# Patient Record
Sex: FEMALE | Race: BLACK OR AFRICAN AMERICAN | NOT HISPANIC OR LATINO | URBAN - METROPOLITAN AREA
[De-identification: names, ages, dates, MRNs, and addresses within clinical notes are randomized per-mention and may not be internally consistent; named-entity substitution may affect disease eponyms.]

---

## 2019-08-19 ENCOUNTER — CONVERSION ENCOUNTER (OUTPATIENT)
Dept: ORTHOPEDIC SURGERY | Facility: CLINIC | Age: 54
End: 2019-08-19

## 2019-08-19 ENCOUNTER — OFFICE VISIT CONVERTED (OUTPATIENT)
Dept: ORTHOPEDIC SURGERY | Facility: CLINIC | Age: 54
End: 2019-08-19
Attending: ORTHOPAEDIC SURGERY

## 2019-08-26 ENCOUNTER — OFFICE VISIT CONVERTED (OUTPATIENT)
Dept: ORTHOPEDIC SURGERY | Facility: CLINIC | Age: 54
End: 2019-08-26
Attending: ORTHOPAEDIC SURGERY

## 2020-06-03 ENCOUNTER — CONVERSION ENCOUNTER (OUTPATIENT)
Dept: ORTHOPEDIC SURGERY | Facility: CLINIC | Age: 55
End: 2020-06-03

## 2020-06-03 ENCOUNTER — OFFICE VISIT CONVERTED (OUTPATIENT)
Dept: ORTHOPEDIC SURGERY | Facility: CLINIC | Age: 55
End: 2020-06-03
Attending: ORTHOPAEDIC SURGERY

## 2021-05-10 NOTE — H&P
History and Physical      Patient Name: Brianna Das   Patient ID: [id number]   Sex: Female   YOB: 1965    Referring Provider: milton CARTAGENA    Visit Date: Radha 3, 2020    Provider: Mayo Hernadez MD   Location: [facility]   Location Address: [address] [place] [address]  [id number]   Location Phone: [phone]          Chief Complaint  · Right Shoulder Pain      History Of Present Illness  Brianna Das is a 55 year old /Black female who presents today to [facility].      She's here for evaluation of right shoulder pain that started in August 2019 from a fall. Patient states pain radiates down right upper arm. Denies receiving any treatment for right shoulder pain. She's using a motorized wheelchair today.       Past Medical History  Anemia; Anemia, Unspecified; Arthritis; Asthma; Bladder Disorder; Bowel Disease; CAD (coronary artery disease); Cervicalgia; Chest pain; Chronic Obstructive Pulmonary Disease; Congestive heart failure; COPD; Diabetes; Diabetes mellitus; GERD; Heart Disease; Hyperlipemia; Hyperlipidemia; Hypertension; Hypothyroidism; Kidney Disease; Limb Swelling; Lumbago; Lumbago/low back pain; Lung disease; Neurologic disorder; Osteoarthritis; Radiculopathy, lumbosacral; Reflux; Seasonal allergies; Shortness of Breath; Stomach Disorder; Thyroid disorder         Past Surgical History  Appendectomy; Cardiac Catherization; Cesarian Section; Cholecystectomy; Colonoscopy; Gallbladder; Hernia; Surgical Clips; Tonsillectomy; Tubal ligation         Medication List  Advair Diskus 250-50 mcg/dose inhalation blister with device; aspirin 81 mg oral tablet,chewable; atorvastatin 20 mg oral tablet; Calcium 600 + D(3) 600 mg calcium- 200 unit oral capsule; cetirizine 10 mg oral tablet; diazepam 2 mg oral tablet; fluticasone 50 mcg/actuation nasal spray,suspension; furosemide 40 mg oral tablet; gabapentin 300 mg oral capsule; hydrochlorothiazide 25 mg  "oral tablet; hydrocodone-acetaminophen 7.5-325 mg oral tablet; hydroxyzine pamoate 50 mg oral capsule; ibuprofen 800 mg oral tablet; Januvia 50 mg oral tablet; levothyroxine 112 mcg oral tablet; lisinopril 20 mg oral tablet; magnesium oxide 400 mg oral capsule; medroxyprogesterone 10 mg oral tablet; metformin 500 mg oral tablet; montelukast 10 mg oral tablet; Nitrostat 0.4 mg sublingual tablet, sublingual; omeprazole 40 mg oral capsule,delayed release(DR/EC); potassium chloride 20 mEq oral tablet extended release; promethazine 25 mg oral tablet; tizanidine 2 mg oral capsule; venlafaxine 150 mg oral capsule,extended release 24hr; Ventolin HFA 90 mcg/actuation inhalation HFA aerosol inhaler; Vitamin D3 1,000 unit oral capsule         Allergy List  No known history of drug allergy; PENICILLINS         Family Medical History  Heart Disease; Diabetes, unspecified type; Diabetes; Osteoporosis; Family history of Arthritis         Social History  Alcohol Use (Never); Claustophobic (Unknown); lives alone; lives with children; Recreational Drug Use (Never); Single.; Tobacco (Former); Unemployed.         Review of Systems  · Constitutional  o Admits  o : weight loss  o Denies  o : fever, chills  · Cardiovascular  o Admits  o : chest pain, shortness of breath  · Gastrointestinal  o Denies  o : liver disease, heartburn, nausea, blood in stools  · Genitourinary  o Denies  o : painful urination, blood in urine  · Integument  o Admits  o : rash, itching  · Neurologic  o Admits  o : headache, weakness  o Denies  o : loss of consciousness  · Musculoskeletal  o Denies  o : painful, swollen joints  · Psychiatric  o Admits  o : anxiety, depression  o Denies  o : drug/alcohol addiction      Vitals  Date Time BP Position Site L\R Cuff Size HR RR TEMP (F) WT  HT  BMI kg/m2 BSA m2 O2 Sat        06/03/2020 02:02 PM      90 - R   364lbs 0oz 4'  10\" 76.08 2.6 96 %          Physical Examination  · Constitutional  o Appearance  o : well " developed, well-nourished, no obvious deformities present  · Head and Face  o Head  o :   § Inspection  § : normocephalic  o Face  o :   § Inspection  § : no facial lesions  · Eyes  o Conjunctivae  o : conjunctivae normal  o Sclerae  o : sclerae white  · Ears, Nose, Mouth and Throat  o Ears  o :   § External Ears  § : appearance within normal limits  § Hearing  § : intact  o Nose  o :   § External Nose  § : appearance normal  · Neck  o Inspection/Palpation  o : normal appearance  o Range of Motion  o : full range of motion  · Respiratory  o Respiratory Effort  o : breathing unlabored  o Inspection of Chest  o : normal appearance  o Auscultation of Lungs  o : no audible wheezing or rales  · Cardiovascular  o Heart  o : regular rate  · Gastrointestinal  o Abdominal Examination  o : soft and non-tender  · Skin and Subcutaneous Tissue  o General Inspection  o : intact, no rashes  · Psychiatric  o General  o : Alert and oriented x3  o Judgement and Insight  o : judgment and insight intact  o Mood and Affect  o : mood normal, affect appropriate  · Right Shoulder  o Inspection  o : No skin discoloration, atrophy, or swelling. Limited shoulder range of motion. Pain with movement. Pain with empty can test. Good tone of deltoid, biceps, triceps, wrist extensors, and wrist flexors. Neurovascularly intact. Sensation grossly intact.   · Injection Note/Aspiration Note  o Site  o : right shoulder  o Procedure  o : Procedure: After educating the patient, patient gave consent for procedure. After using Chloraprep, the joint space was injected. The patient tolerated the procedure well.   o Medication  o : 80 mg of DepoMedrol with 9cc of 1% Lidocaine  · In Office Procedures  o View  o : AP/LATERAL  o Site  o : right, shoulder   o Indication  o : Right shoulder pain   o Study  o : X-rays ordered, taken in the office, and reviewed today.  o Xray  o : Minimal osteoarthritic changes; Negative for fracture or dislocation.   o Comparative  Data  o : No comparative data found          Assessment  · Right shoulder pain, unspecified chronicity     719.41/M25.511  · Right shoulder rotator cuff tendinitis     726.90/M77.9      Plan  · Orders  o Depo-Medrol injection 80mg () - - 06/03/2020   Lot [id number] Exp 05 2021 [employer] Administered by TAMIKO Hernadez MD  o Shoulder Intra-articular Injection without US Guidance [facility] ([id number]) - - 06/03/2020   Lot [id number] DK Exp 07 01 2021 [employer] Administered by TAMIKO Hernadez MD  o Shoulder (Right) [facility] ([id number]) - 719.41/M25.511 - 06/03/2020  · Medications  o Medications have been Reconciled  o Transition of Care or Provider Policy  · Instructions  o Reviewed the patient's Past Medical, Social, and Family history as well as the ROS at today's visit, no changes.  o Call or return if worsening symptoms.  o The above service was scribed by Janie Zaragoza on my behalf and I attest to the accuracy of the note. pavan  o Discussed conservative management with patient. The plan is a right shoulder steroid injection. Follow up prn.             Electronically Signed by: Shannon Zaragoza - , Other -Author on June 4, 2020 01:41:46 PM  Electronically Co-signed by: Mayo Hernadez MD -Reviewer on June 8, 2020 09:27:45 AM

## 2021-05-15 VITALS — WEIGHT: 293 LBS | HEIGHT: 58 IN | BODY MASS INDEX: 61.5 KG/M2

## 2021-05-15 VITALS — OXYGEN SATURATION: 95 % | HEIGHT: 58 IN | BODY MASS INDEX: 61.5 KG/M2 | HEART RATE: 89 BPM | WEIGHT: 293 LBS

## 2021-05-15 VITALS — OXYGEN SATURATION: 96 % | WEIGHT: 293 LBS | BODY MASS INDEX: 61.5 KG/M2 | HEART RATE: 90 BPM | HEIGHT: 58 IN
